# Patient Record
Sex: MALE | Race: WHITE | NOT HISPANIC OR LATINO | Employment: FULL TIME | URBAN - NONMETROPOLITAN AREA
[De-identification: names, ages, dates, MRNs, and addresses within clinical notes are randomized per-mention and may not be internally consistent; named-entity substitution may affect disease eponyms.]

---

## 2024-02-27 ENCOUNTER — APPOINTMENT (OUTPATIENT)
Dept: RADIOLOGY | Facility: HOSPITAL | Age: 49
End: 2024-02-27
Payer: COMMERCIAL

## 2024-02-27 ENCOUNTER — APPOINTMENT (OUTPATIENT)
Dept: CARDIOLOGY | Facility: HOSPITAL | Age: 49
End: 2024-02-27
Payer: COMMERCIAL

## 2024-02-27 ENCOUNTER — HOSPITAL ENCOUNTER (EMERGENCY)
Facility: HOSPITAL | Age: 49
Discharge: HOME | End: 2024-02-27
Attending: EMERGENCY MEDICINE
Payer: COMMERCIAL

## 2024-02-27 VITALS
OXYGEN SATURATION: 95 % | HEIGHT: 69 IN | BODY MASS INDEX: 28.88 KG/M2 | SYSTOLIC BLOOD PRESSURE: 126 MMHG | WEIGHT: 195 LBS | HEART RATE: 70 BPM | RESPIRATION RATE: 20 BRPM | DIASTOLIC BLOOD PRESSURE: 95 MMHG

## 2024-02-27 DIAGNOSIS — R07.9 CHEST PAIN, UNSPECIFIED TYPE: Primary | ICD-10-CM

## 2024-02-27 LAB
ALBUMIN SERPL BCP-MCNC: 5 G/DL (ref 3.4–5)
ALP SERPL-CCNC: 87 U/L (ref 33–120)
ALT SERPL W P-5'-P-CCNC: 110 U/L (ref 10–52)
ANION GAP SERPL CALC-SCNC: 15 MMOL/L (ref 10–20)
AST SERPL W P-5'-P-CCNC: 46 U/L (ref 9–39)
BASOPHILS # BLD AUTO: 0.04 X10*3/UL (ref 0–0.1)
BASOPHILS NFR BLD AUTO: 0.6 %
BILIRUB SERPL-MCNC: 0.5 MG/DL (ref 0–1.2)
BUN SERPL-MCNC: 14 MG/DL (ref 6–23)
CALCIUM SERPL-MCNC: 9.6 MG/DL (ref 8.6–10.3)
CARDIAC TROPONIN I PNL SERPL HS: <3 NG/L (ref 0–20)
CARDIAC TROPONIN I PNL SERPL HS: <3 NG/L (ref 0–20)
CHLORIDE SERPL-SCNC: 102 MMOL/L (ref 98–107)
CO2 SERPL-SCNC: 25 MMOL/L (ref 21–32)
CREAT SERPL-MCNC: 0.95 MG/DL (ref 0.5–1.3)
EGFRCR SERPLBLD CKD-EPI 2021: >90 ML/MIN/1.73M*2
EOSINOPHIL # BLD AUTO: 0.23 X10*3/UL (ref 0–0.7)
EOSINOPHIL NFR BLD AUTO: 3.6 %
ERYTHROCYTE [DISTWIDTH] IN BLOOD BY AUTOMATED COUNT: 12 % (ref 11.5–14.5)
GLUCOSE SERPL-MCNC: 99 MG/DL (ref 74–99)
HCT VFR BLD AUTO: 42.4 % (ref 41–52)
HGB BLD-MCNC: 15.1 G/DL (ref 13.5–17.5)
IMM GRANULOCYTES # BLD AUTO: 0.03 X10*3/UL (ref 0–0.7)
IMM GRANULOCYTES NFR BLD AUTO: 0.5 % (ref 0–0.9)
LYMPHOCYTES # BLD AUTO: 2.01 X10*3/UL (ref 1.2–4.8)
LYMPHOCYTES NFR BLD AUTO: 31.1 %
MAGNESIUM SERPL-MCNC: 1.92 MG/DL (ref 1.6–2.4)
MCH RBC QN AUTO: 32.3 PG (ref 26–34)
MCHC RBC AUTO-ENTMCNC: 35.6 G/DL (ref 32–36)
MCV RBC AUTO: 91 FL (ref 80–100)
MONOCYTES # BLD AUTO: 0.58 X10*3/UL (ref 0.1–1)
MONOCYTES NFR BLD AUTO: 9 %
NEUTROPHILS # BLD AUTO: 3.58 X10*3/UL (ref 1.2–7.7)
NEUTROPHILS NFR BLD AUTO: 55.2 %
NRBC BLD-RTO: 0 /100 WBCS (ref 0–0)
PLATELET # BLD AUTO: 286 X10*3/UL (ref 150–450)
POTASSIUM SERPL-SCNC: 3.5 MMOL/L (ref 3.5–5.3)
PROT SERPL-MCNC: 7.7 G/DL (ref 6.4–8.2)
RBC # BLD AUTO: 4.68 X10*6/UL (ref 4.5–5.9)
SODIUM SERPL-SCNC: 138 MMOL/L (ref 136–145)
WBC # BLD AUTO: 6.5 X10*3/UL (ref 4.4–11.3)

## 2024-02-27 PROCEDURE — 36415 COLL VENOUS BLD VENIPUNCTURE: CPT | Performed by: EMERGENCY MEDICINE

## 2024-02-27 PROCEDURE — 84484 ASSAY OF TROPONIN QUANT: CPT | Performed by: EMERGENCY MEDICINE

## 2024-02-27 PROCEDURE — 80053 COMPREHEN METABOLIC PANEL: CPT | Performed by: EMERGENCY MEDICINE

## 2024-02-27 PROCEDURE — 85025 COMPLETE CBC W/AUTO DIFF WBC: CPT | Performed by: EMERGENCY MEDICINE

## 2024-02-27 PROCEDURE — 83735 ASSAY OF MAGNESIUM: CPT | Performed by: EMERGENCY MEDICINE

## 2024-02-27 PROCEDURE — 93005 ELECTROCARDIOGRAM TRACING: CPT

## 2024-02-27 PROCEDURE — 71275 CT ANGIOGRAPHY CHEST: CPT

## 2024-02-27 PROCEDURE — 71275 CT ANGIOGRAPHY CHEST: CPT | Mod: FOREIGN READ | Performed by: RADIOLOGY

## 2024-02-27 PROCEDURE — 2550000001 HC RX 255 CONTRASTS: Performed by: EMERGENCY MEDICINE

## 2024-02-27 PROCEDURE — 99285 EMERGENCY DEPT VISIT HI MDM: CPT | Mod: 25

## 2024-02-27 RX ORDER — CETIRIZINE HYDROCHLORIDE 5 MG/1
5 TABLET, CHEWABLE ORAL DAILY
COMMUNITY

## 2024-02-27 RX ADMIN — IOHEXOL 75 ML: 350 INJECTION, SOLUTION INTRAVENOUS at 18:05

## 2024-02-27 ASSESSMENT — COLUMBIA-SUICIDE SEVERITY RATING SCALE - C-SSRS
2. HAVE YOU ACTUALLY HAD ANY THOUGHTS OF KILLING YOURSELF?: NO
1. IN THE PAST MONTH, HAVE YOU WISHED YOU WERE DEAD OR WISHED YOU COULD GO TO SLEEP AND NOT WAKE UP?: NO
6. HAVE YOU EVER DONE ANYTHING, STARTED TO DO ANYTHING, OR PREPARED TO DO ANYTHING TO END YOUR LIFE?: NO

## 2024-02-27 ASSESSMENT — PAIN - FUNCTIONAL ASSESSMENT: PAIN_FUNCTIONAL_ASSESSMENT: 0-10

## 2024-02-27 ASSESSMENT — PAIN SCALES - GENERAL: PAINLEVEL_OUTOF10: 3

## 2024-02-28 NOTE — ED PROVIDER NOTES
HPI   Chief Complaint   Patient presents with    Chest Pain     Pressure x 1 week, jaw pain this morning        HPI                    No data recorded                   Patient History   Past Medical History:   Diagnosis Date    Hyperlipidemia, unspecified 09/04/2014    Hyperlipidemia     Past Surgical History:   Procedure Laterality Date    OTHER SURGICAL HISTORY  08/05/2013    Reported Prior Surgical / Procedural History     No family history on file.  Social History     Tobacco Use    Smoking status: Never    Smokeless tobacco: Never   Vaping Use    Vaping Use: Never used   Substance Use Topics    Alcohol use: Yes     Comment: social    Drug use: Never       Physical Exam   ED Triage Vitals [02/27/24 1739]   Temp Heart Rate Respirations BP   -- 94 16 (!) 143/102      Pulse Ox Temp src Heart Rate Source Patient Position   96 % -- -- --      BP Location FiO2 (%)     -- --       Physical Exam  Vitals and nursing note reviewed.   Constitutional:       General: He is not in acute distress.     Appearance: He is well-developed.   HENT:      Head: Normocephalic and atraumatic.   Eyes:      Conjunctiva/sclera: Conjunctivae normal.   Cardiovascular:      Rate and Rhythm: Normal rate and regular rhythm.      Heart sounds: No murmur heard.  Pulmonary:      Effort: Pulmonary effort is normal. No respiratory distress.      Breath sounds: Normal breath sounds.   Abdominal:      Palpations: Abdomen is soft.      Tenderness: There is no abdominal tenderness.   Musculoskeletal:         General: No swelling.      Cervical back: Neck supple.   Skin:     General: Skin is warm and dry.      Capillary Refill: Capillary refill takes less than 2 seconds.   Neurological:      Mental Status: He is alert.   Psychiatric:         Mood and Affect: Mood normal.         ED Course & MDM   ED Course as of 02/27/24 1914 Tue Feb 27, 2024   1831 EKG performed at 1733 showing normal sinus rhythm no ST elevation or depression essentially normal EKG  interpreted by me. [KA]      ED Course User Index  [KA] Graham Galo DO         Diagnoses as of 02/27/24 1914   Chest pain, unspecified type       Medical Decision Making  49-year-old male otherwise healthy no family history no medical problems himself presents to the ER with chest pain for about a week.  Patient got out of town tomorrow so got concerned.  Patient workup initiated here in the ED EKG is unremarkable waiting for blood work and imaging.  Patient care be signed out to the night physician    Repeat EKG performed at 1903 normal sinus rhythm no ST elevation or depression essentially normal EKG interpreted by me.        Procedure  Procedures     Graham Galo DO  02/27/24 1914

## 2024-02-28 NOTE — PROGRESS NOTES
Patient was signed out to me at change of shift by the previous ED team.  Please see previous provider's note for complete history and physical exam.    Briefly, this is a 49-year-old male, presenting to the emergency department for chest pain for the last 7 to 10 days.  Patient states the pain is in the center of his chest, and is worse with coughing, sneezing.  On physical exam, patient is resting comfortably in the bed, no acute distress.  Previous provider did lab work including CBC, CMP, troponin x 2, all of which was negative.  At time of signout, the patient is pending CT PE study.  On reevaluation, the patient is resting comfortably in the bed, in no acute distress.  CT PE study was negative for evidence of pulmonary embolism with an unremarkable CT chest.  I had a long discussion with the patient about needing to follow-up with his primary care provider and cardiologist.  Patient mentions that he is going to [place] for a snowmobiling trip.  I advised him to not ignore his chest pain, and to call 911 if his chest pain gets worse in [place].  I have a low suspicion for cardiac pathology as a cause of the chest pain, however I do feel that he needs close outpatient follow-up which was communicated with the patient.  Patient expressed understanding.  He was ultimately discharged home in stable condition, advised to return to the emergency department for any worsening symptoms.    Impression: Chest pain  Disposition: Discharge home  Condition: Stable    Lianet Mleo,   Emergency Medicine

## 2024-03-03 LAB
ATRIAL RATE: 77 BPM
ATRIAL RATE: 93 BPM
P AXIS: 14 DEGREES
P AXIS: 42 DEGREES
P OFFSET: 200 MS
P OFFSET: 203 MS
P ONSET: 146 MS
P ONSET: 157 MS
PR INTERVAL: 126 MS
PR INTERVAL: 144 MS
Q ONSET: 218 MS
Q ONSET: 220 MS
QRS COUNT: 13 BEATS
QRS COUNT: 15 BEATS
QRS DURATION: 100 MS
QRS DURATION: 96 MS
QT INTERVAL: 344 MS
QT INTERVAL: 394 MS
QTC CALCULATION(BAZETT): 427 MS
QTC CALCULATION(BAZETT): 445 MS
QTC FREDERICIA: 398 MS
QTC FREDERICIA: 428 MS
R AXIS: 24 DEGREES
R AXIS: 52 DEGREES
T AXIS: 44 DEGREES
T AXIS: 54 DEGREES
T OFFSET: 392 MS
T OFFSET: 415 MS
VENTRICULAR RATE: 77 BPM
VENTRICULAR RATE: 93 BPM

## 2024-04-07 PROBLEM — R07.89 OTHER CHEST PAIN: Status: ACTIVE | Noted: 2024-04-07

## 2024-04-11 ENCOUNTER — OFFICE VISIT (OUTPATIENT)
Dept: CARDIOLOGY | Facility: CLINIC | Age: 49
End: 2024-04-11
Payer: COMMERCIAL

## 2024-04-11 VITALS
WEIGHT: 203 LBS | BODY MASS INDEX: 29.98 KG/M2 | SYSTOLIC BLOOD PRESSURE: 132 MMHG | HEART RATE: 70 BPM | DIASTOLIC BLOOD PRESSURE: 84 MMHG

## 2024-04-11 DIAGNOSIS — R07.89 OTHER CHEST PAIN: Primary | ICD-10-CM

## 2024-04-11 DIAGNOSIS — I10 PRIMARY HYPERTENSION: ICD-10-CM

## 2024-04-11 PROCEDURE — 99213 OFFICE O/P EST LOW 20 MIN: CPT | Performed by: INTERNAL MEDICINE

## 2024-04-11 PROCEDURE — 99203 OFFICE O/P NEW LOW 30 MIN: CPT | Performed by: INTERNAL MEDICINE

## 2024-04-11 PROCEDURE — 3079F DIAST BP 80-89 MM HG: CPT | Performed by: INTERNAL MEDICINE

## 2024-04-11 PROCEDURE — 3075F SYST BP GE 130 - 139MM HG: CPT | Performed by: INTERNAL MEDICINE

## 2024-04-11 PROCEDURE — 1036F TOBACCO NON-USER: CPT | Performed by: INTERNAL MEDICINE

## 2024-04-11 ASSESSMENT — ENCOUNTER SYMPTOMS
LOSS OF SENSATION IN FEET: 0
DEPRESSION: 0
OCCASIONAL FEELINGS OF UNSTEADINESS: 0

## 2024-04-11 ASSESSMENT — PAIN SCALES - GENERAL: PAINLEVEL: 0-NO PAIN

## 2024-04-11 NOTE — ASSESSMENT & PLAN NOTE
Chest pain likely musculoskeletal and inflammatory in nature.  He had prolonged chest discomfort with negative EKGs and troponins in the emergency department.  The discomfort resolved on its own and he had a very heavy exertional trip in [place] with no recurrent symptoms whatsoever.  Do not feel that further investigation is needed at this time.  I did ask the patient to return to me if he does have recurrent symptoms.

## 2024-04-11 NOTE — PROGRESS NOTES
Referred by Dr. Banuelos ref. provider found for Establish Care     History Of Present Illness:    Ryan Sheehan is a 49 y.o. male presenting with chest pain.  The patient states that a few weeks ago he developed an episode of chest discomfort.  It was achy pain in his mid chest without radiation or associated signs or symptoms.  The pain would intensify if he sneezed and become more sharp.  But the discomfort was relatively constant and he had scheduled a trip to [place] to [facility] which is a very exertionally heavy.  Still he was a little bit concerned and then 1 morning he developed an aching sensation in his jaws.  With this discomfort as well as the upcoming trip he decided to report to our emergency department where he had a negative investigation.  The discomfort has subsequently resolved totally and he feels well at this time.  But the emergency department did suggest seeing a cardiologist just reports here today.  His twelve-lead EKG was normal and laboratory studies were all unremarkable.    Past Medical History:  He has a past medical history of Hyperlipidemia, unspecified (2014).    Past Surgical History:  None      Social History:  He reports that he has never smoked. He has never used smokeless tobacco. He reports current alcohol use. He reports that he does not use drugs.    Works as a     Family History:  Mother  at age 72 from complications of liver failure.  Father  at age 70 with COPD and heart failure.  At the father has not had any coronary interventions in his history.     Allergies:  Sulfa (sulfonamide antibiotics)    Outpatient Medications:  Current Outpatient Medications   Medication Instructions    cetirizine (ZYRTEC) 5 mg, oral, Daily        Last Recorded Vitals:  Vitals:    24 1022   BP: 132/84   Pulse: 70   Weight: 92.1 kg (203 lb)       Physical Exam:  Constitutional:       Appearance: Not in distress.   Eyes:      Conjunctiva/sclera:  "Conjunctivae normal.   HENT:    Mouth/Throat:      Pharynx: Oropharynx is clear.   Neck:      Vascular: No carotid bruit. JVD normal.   Pulmonary:      Breath sounds: Normal breath sounds. No wheezing. No rales.   Cardiovascular:      Regular rhythm.      Murmurs: There is no murmur.      No gallop.  No click. No rub.   Abdominal:      Palpations: Abdomen is soft.      Tenderness: There is no abdominal tenderness.   Musculoskeletal:         General: No deformity. Neurological:      General: No focal deficit present.             Last Labs:  CBC -  Lab Results   Component Value Date    WBC 6.5 02/27/2024    HGB 15.1 02/27/2024    HCT 42.4 02/27/2024    MCV 91 02/27/2024     02/27/2024       CMP -  Lab Results   Component Value Date    CALCIUM 9.6 02/27/2024    PROT 7.7 02/27/2024    ALBUMIN 5.0 02/27/2024    AST 46 (H) 02/27/2024     (H) 02/27/2024    ALKPHOS 87 02/27/2024    BILITOT 0.5 02/27/2024       LIPID PANEL -   No results found for: \"CHOL\", \"TRIG\", \"HDL\", \"CHHDL\", \"LDLF\", \"VLDL\", \"NHDL\"    RENAL FUNCTION PANEL -   Lab Results   Component Value Date    GLUCOSE 99 02/27/2024     02/27/2024    K 3.5 02/27/2024     02/27/2024    CO2 25 02/27/2024    ANIONGAP 15 02/27/2024    BUN 14 02/27/2024    CREATININE 0.95 02/27/2024    CALCIUM 9.6 02/27/2024    ALBUMIN 5.0 02/27/2024        No results found for: \"BNP\", \"HGBA1C\"    Last Cardiology Tests:  ECG:  ECG 12 lead 02/27/2024    Normal ECG per reportEcho:  No results found for this or any previous visit from the past 1095 days.      Ejection Fractions:  No results found for: \"EF\"    Cath:  No results found for this or any previous visit from the past 1095 days.      Stress Test:  No results found for this or any previous visit from the past 1095 days.      Cardiac Imaging:  No results found for this or any previous visit from the past 1095 days.            Assessment/Plan     Primary hypertension  Blood pressure is borderline on my check.  " Reviewed his home blood pressure log and the vast majority are all very well-controlled with a few mild elevations.  Recommended hygienic measures including regular physical activity and limitations on sodium intake.   recommend the patient check his blood pressure on a regular basis and if he has persistently elevated readings then certainly return for reevaluation.    Other chest pain  Chest pain likely musculoskeletal and inflammatory in nature.  He had prolonged chest discomfort with negative EKGs and troponins in the emergency department.  The discomfort resolved on its own and he had a very heavy exertional trip in [place] with no recurrent symptoms whatsoever.  Do not feel that further investigation is needed at this time.  I did ask the patient to return to me if he does have recurrent symptoms.       Osman Santiago, DO

## 2024-04-11 NOTE — ASSESSMENT & PLAN NOTE
Blood pressure is borderline on my check.  Reviewed his home blood pressure log and the vast majority are all very well-controlled with a few mild elevations.  Recommended hygienic measures including regular physical activity and limitations on sodium intake.   recommend the patient check his blood pressure on a regular basis and if he has persistently elevated readings then certainly return for reevaluation.

## 2024-04-15 NOTE — PROGRESS NOTES
Subjective   Reason for Visit: Ryan Sheehan is an 49 y.o. male here for a CPE     Chief Complaint   Patient presents with    Annual Exam     Patient comes in today for a NPV and CPE. He previously saw Dr. Verde his last CPE was 2016. He states he had gone to the ED for chest pains in Feb. 2024 the ED saw nothing of concern. He then followed up with Cardiologist Dr. Santiago who also saw no concerns but did suggest he establish care with a PCP.       JOSSY Davis is a 50 yo male presenting today as new pt to est care  Previous PCP: Dr Verde     Dx: URTICARIA    Pt has no acute c/o today  Needed to est with PCP     States in Feb 2024 he started having weird red raised marks, ? Hives on random places on his body  Started taking Zyrtec and they have gone away    Pt is followed by cardiology, Dr. Santiago   Pt had gone to ED in Feb for 7-10 days of ongoing CP  Work-up was negative for cardiac     Occupation: Works as a banker at [employer]     Do you take any herbs or supplements that were not prescribed by a doctor? Denies   Colon cancer screening: Due   PSA: N/A due to no family hx   Fasting blood work: Due   HIV/HEP C Screening: Due   Last eye exam: 2016  Last dental Exam: 2023  Exercise: Not regularly     Allergies   Allergen Reactions    Sulfa (Sulfonamide Antibiotics) Myalgia       Admission on 02/27/2024, Discharged on 02/27/2024   Component Date Value Ref Range Status    WBC 02/27/2024 6.5  4.4 - 11.3 x10*3/uL Final    nRBC 02/27/2024 0.0  0.0 - 0.0 /100 WBCs Final    RBC 02/27/2024 4.68  4.50 - 5.90 x10*6/uL Final    Hemoglobin 02/27/2024 15.1  13.5 - 17.5 g/dL Final    Hematocrit 02/27/2024 42.4  41.0 - 52.0 % Final    MCV 02/27/2024 91  80 - 100 fL Final    MCH 02/27/2024 32.3  26.0 - 34.0 pg Final    MCHC 02/27/2024 35.6  32.0 - 36.0 g/dL Final    RDW 02/27/2024 12.0  11.5 - 14.5 % Final    Platelets 02/27/2024 286  150 - 450 x10*3/uL Final    Neutrophils % 02/27/2024 55.2  40.0 - 80.0 % Final     Immature Granulocytes %, Automated 02/27/2024 0.5  0.0 - 0.9 % Final    Immature Granulocyte Count (IG) includes promyelocytes, myelocytes and metamyelocytes but does not include bands. Percent differential counts (%) should be interpreted in the context of the absolute cell counts (cells/UL).    Lymphocytes % 02/27/2024 31.1  13.0 - 44.0 % Final    Monocytes % 02/27/2024 9.0  2.0 - 10.0 % Final    Eosinophils % 02/27/2024 3.6  0.0 - 6.0 % Final    Basophils % 02/27/2024 0.6  0.0 - 2.0 % Final    Neutrophils Absolute 02/27/2024 3.58  1.20 - 7.70 x10*3/uL Final    Percent differential counts (%) should be interpreted in the context of the absolute cell counts (cells/uL).    Immature Granulocytes Absolute, Au* 02/27/2024 0.03  0.00 - 0.70 x10*3/uL Final    Lymphocytes Absolute 02/27/2024 2.01  1.20 - 4.80 x10*3/uL Final    Monocytes Absolute 02/27/2024 0.58  0.10 - 1.00 x10*3/uL Final    Eosinophils Absolute 02/27/2024 0.23  0.00 - 0.70 x10*3/uL Final    Basophils Absolute 02/27/2024 0.04  0.00 - 0.10 x10*3/uL Final    Glucose 02/27/2024 99  74 - 99 mg/dL Final    Sodium 02/27/2024 138  136 - 145 mmol/L Final    Potassium 02/27/2024 3.5  3.5 - 5.3 mmol/L Final    Chloride 02/27/2024 102  98 - 107 mmol/L Final    Bicarbonate 02/27/2024 25  21 - 32 mmol/L Final    Anion Gap 02/27/2024 15  10 - 20 mmol/L Final    Urea Nitrogen 02/27/2024 14  6 - 23 mg/dL Final    Creatinine 02/27/2024 0.95  0.50 - 1.30 mg/dL Final    eGFR 02/27/2024 >90  >60 mL/min/1.73m*2 Final    Calculations of estimated GFR are performed using the 2021 CKD-EPI Study Refit equation without the race variable for the IDMS-Traceable creatinine methods.  https://jasn.asnjournals.org/content/early/2021/09/22/ASN.2695891130    Calcium 02/27/2024 9.6  8.6 - 10.3 mg/dL Final    Albumin 02/27/2024 5.0  3.4 - 5.0 g/dL Final    Alkaline Phosphatase 02/27/2024 87  33 - 120 U/L Final    Total Protein 02/27/2024 7.7  6.4 - 8.2 g/dL Final    AST 02/27/2024 46 (H)  9  "- 39 U/L Final    Bilirubin, Total 02/27/2024 0.5  0.0 - 1.2 mg/dL Final    ALT 02/27/2024 110 (H)  10 - 52 U/L Final    Patients treated with Sulfasalazine may generate falsely decreased results for ALT.    Magnesium 02/27/2024 1.92  1.60 - 2.40 mg/dL Final    Ventricular Rate 02/27/2024 93  BPM Final    Atrial Rate 02/27/2024 93  BPM Final    IA Interval 02/27/2024 126  ms Final    QRS Duration 02/27/2024 100  ms Final    QT Interval 02/27/2024 344  ms Final    QTC Calculation(Bazett) 02/27/2024 427  ms Final    P Axis 02/27/2024 14  degrees Final    R Axis 02/27/2024 52  degrees Final    T Axis 02/27/2024 54  degrees Final    QRS Count 02/27/2024 15  beats Final    Q Onset 02/27/2024 220  ms Final    P Onset 02/27/2024 157  ms Final    P Offset 02/27/2024 203  ms Final    T Offset 02/27/2024 392  ms Final    QTC Fredericia 02/27/2024 398  ms Final    Ventricular Rate 02/27/2024 77  BPM Final    Atrial Rate 02/27/2024 77  BPM Final    IA Interval 02/27/2024 144  ms Final    QRS Duration 02/27/2024 96  ms Final    QT Interval 02/27/2024 394  ms Final    QTC Calculation(Bazett) 02/27/2024 445  ms Final    P Axis 02/27/2024 42  degrees Final    R Axis 02/27/2024 24  degrees Final    T Axis 02/27/2024 44  degrees Final    QRS Count 02/27/2024 13  beats Final    Q Onset 02/27/2024 218  ms Final    P Onset 02/27/2024 146  ms Final    P Offset 02/27/2024 200  ms Final    T Offset 02/27/2024 415  ms Final    QTC Fredericia 02/27/2024 428  ms Final    Troponin I, High Sensitivity 02/27/2024 <3  0 - 20 ng/L Final    Troponin I, High Sensitivity 02/27/2024 <3  0 - 20 ng/L Final     Patient Care Team:  TOÑA Jenkins as PCP - General (Family Medicine)  Nathan S Franco, APRN-CNP as PCP - [employer] [facility] PCP     Review of Systems  ROS was completed and all systems are negative with the exception of what was noted in the the HPI.       Objective   Vitals:  /84   Pulse 77   Ht 1.753 m (5' 9\")   Wt 93.8 kg (206 lb " 12.8 oz)   SpO2 94%   BMI 30.54 kg/m²       Current Outpatient Medications   Medication Instructions    cetirizine (ZYRTEC) 5 mg, oral, Daily       Physical Exam  Vitals reviewed.   HENT:      Left Ear: Tympanic membrane normal.      Mouth/Throat:      Mouth: Mucous membranes are moist.   Eyes:      Conjunctiva/sclera: Conjunctivae normal.   Cardiovascular:      Pulses: Normal pulses.      Heart sounds: Normal heart sounds.   Pulmonary:      Effort: Pulmonary effort is normal.      Breath sounds: Normal breath sounds.   Abdominal:      General: Bowel sounds are normal.   Skin:     General: Skin is warm and dry.      Capillary Refill: Capillary refill takes less than 2 seconds.   Neurological:      Mental Status: He is alert and oriented to person, place, and time.   Psychiatric:         Mood and Affect: Mood normal.         Thought Content: Thought content normal.         Judgment: Judgment normal.         Assessment/Plan   Problem List Items Addressed This Visit             ICD-10-CM    Annual physical exam - Primary Z00.00     - Counseled on healthy diet and regular exercise  - Fall avoidance information provided  - Personalized prevention plan provided   - Colon   - Vaccines UTD                    Relevant Orders    Comprehensive Metabolic Panel    Lipid Panel     Other Visit Diagnoses         Codes    Screening for HIV without presence of risk factors     Z11.4    Relevant Orders    HIV 1/2 Antigen/Antibody Screen with Reflex to Confirmation    Encounter for hepatitis C screening test for low risk patient     Z11.59    Relevant Orders    Hepatitis C Antibody    Screening for malignant neoplasm of colon     Z12.11    Relevant Orders    Cologuard® colon cancer screening    Need for vaccination     Z23    Relevant Orders    Tdap vaccine, age 7 years and older  (BOOSTRIX) (Completed)

## 2024-04-16 ENCOUNTER — OFFICE VISIT (OUTPATIENT)
Dept: PRIMARY CARE | Facility: CLINIC | Age: 49
End: 2024-04-16
Payer: COMMERCIAL

## 2024-04-16 VITALS
OXYGEN SATURATION: 94 % | SYSTOLIC BLOOD PRESSURE: 120 MMHG | DIASTOLIC BLOOD PRESSURE: 84 MMHG | HEIGHT: 69 IN | HEART RATE: 77 BPM | WEIGHT: 206.8 LBS | BODY MASS INDEX: 30.63 KG/M2

## 2024-04-16 DIAGNOSIS — Z11.59 ENCOUNTER FOR HEPATITIS C SCREENING TEST FOR LOW RISK PATIENT: ICD-10-CM

## 2024-04-16 DIAGNOSIS — Z23 NEED FOR VACCINATION: ICD-10-CM

## 2024-04-16 DIAGNOSIS — Z11.4 SCREENING FOR HIV WITHOUT PRESENCE OF RISK FACTORS: ICD-10-CM

## 2024-04-16 DIAGNOSIS — Z00.00 ANNUAL PHYSICAL EXAM: Primary | ICD-10-CM

## 2024-04-16 DIAGNOSIS — Z12.11 SCREENING FOR MALIGNANT NEOPLASM OF COLON: ICD-10-CM

## 2024-04-16 PROBLEM — R07.89 OTHER CHEST PAIN: Status: RESOLVED | Noted: 2024-04-07 | Resolved: 2024-04-16

## 2024-04-16 PROBLEM — D22.5 MELANOCYTIC NEVI OF TRUNK: Status: ACTIVE | Noted: 2022-09-20

## 2024-04-16 PROBLEM — E78.5 HYPERLIPIDEMIA: Status: ACTIVE | Noted: 2024-04-16

## 2024-04-16 PROCEDURE — 90471 IMMUNIZATION ADMIN: CPT | Performed by: NURSE PRACTITIONER

## 2024-04-16 PROCEDURE — 3079F DIAST BP 80-89 MM HG: CPT | Performed by: NURSE PRACTITIONER

## 2024-04-16 PROCEDURE — 99386 PREV VISIT NEW AGE 40-64: CPT | Performed by: NURSE PRACTITIONER

## 2024-04-16 PROCEDURE — 1036F TOBACCO NON-USER: CPT | Performed by: NURSE PRACTITIONER

## 2024-04-16 PROCEDURE — 90715 TDAP VACCINE 7 YRS/> IM: CPT | Performed by: NURSE PRACTITIONER

## 2024-04-16 PROCEDURE — 3074F SYST BP LT 130 MM HG: CPT | Performed by: NURSE PRACTITIONER

## 2024-04-16 NOTE — PATIENT INSTRUCTIONS
Thank you for seeing me today.  It was a pleasure to see you again!    Today we did your Annual Physical Exam and discussed the following:     Tdap today     Do Cologuard    Lab work ordered today.  Please have your blood drawn in the next 1-2 weeks.  You need to be FASTING for 12 hours prior to blood draw.  You may only have water.  Please contact your insurance company to ask about the best location to get blood drawn.  We will contact you with the results of your blood work and any necessary adjustments  to your plan of care, if you do not hear from us within 3-5 days of having your blood drawn, please call the office at [phone].    RTC ANNUALLY AND AS NEEDED

## 2024-04-16 NOTE — ASSESSMENT & PLAN NOTE
- Counseled on healthy diet and regular exercise  - Fall avoidance information provided  - Personalized prevention plan provided   - Colon   - Vaccines UTD

## 2024-04-23 ENCOUNTER — LAB (OUTPATIENT)
Dept: LAB | Facility: LAB | Age: 49
End: 2024-04-23
Payer: COMMERCIAL

## 2024-04-23 DIAGNOSIS — Z00.00 ANNUAL PHYSICAL EXAM: ICD-10-CM

## 2024-04-23 DIAGNOSIS — Z11.59 ENCOUNTER FOR HEPATITIS C SCREENING TEST FOR LOW RISK PATIENT: ICD-10-CM

## 2024-04-23 DIAGNOSIS — Z11.4 SCREENING FOR HIV WITHOUT PRESENCE OF RISK FACTORS: ICD-10-CM

## 2024-04-23 LAB
ALBUMIN SERPL BCP-MCNC: 4.6 G/DL (ref 3.4–5)
ALP SERPL-CCNC: 68 U/L (ref 33–120)
ALT SERPL W P-5'-P-CCNC: 77 U/L (ref 10–52)
ANION GAP SERPL CALC-SCNC: 13 MMOL/L (ref 10–20)
AST SERPL W P-5'-P-CCNC: 35 U/L (ref 9–39)
BILIRUB SERPL-MCNC: 0.7 MG/DL (ref 0–1.2)
BUN SERPL-MCNC: 15 MG/DL (ref 6–23)
CALCIUM SERPL-MCNC: 9.2 MG/DL (ref 8.6–10.3)
CHLORIDE SERPL-SCNC: 104 MMOL/L (ref 98–107)
CHOLEST SERPL-MCNC: 223 MG/DL (ref 0–199)
CHOLESTEROL/HDL RATIO: 4.2
CO2 SERPL-SCNC: 26 MMOL/L (ref 21–32)
CREAT SERPL-MCNC: 0.96 MG/DL (ref 0.5–1.3)
EGFRCR SERPLBLD CKD-EPI 2021: >90 ML/MIN/1.73M*2
GLUCOSE SERPL-MCNC: 99 MG/DL (ref 74–99)
HCV AB SER QL: NONREACTIVE
HDLC SERPL-MCNC: 52.7 MG/DL
HIV 1+2 AB+HIV1 P24 AG SERPL QL IA: NONREACTIVE
LDLC SERPL CALC-MCNC: 154 MG/DL
NON HDL CHOLESTEROL: 170 MG/DL (ref 0–149)
POTASSIUM SERPL-SCNC: 4 MMOL/L (ref 3.5–5.3)
PROT SERPL-MCNC: 7 G/DL (ref 6.4–8.2)
SODIUM SERPL-SCNC: 139 MMOL/L (ref 136–145)
TRIGL SERPL-MCNC: 81 MG/DL (ref 0–149)
VLDL: 16 MG/DL (ref 0–40)

## 2024-04-23 PROCEDURE — 80061 LIPID PANEL: CPT

## 2024-04-23 PROCEDURE — 86803 HEPATITIS C AB TEST: CPT

## 2024-04-23 PROCEDURE — 36415 COLL VENOUS BLD VENIPUNCTURE: CPT

## 2024-04-23 PROCEDURE — 80053 COMPREHEN METABOLIC PANEL: CPT

## 2024-04-23 PROCEDURE — 87389 HIV-1 AG W/HIV-1&-2 AB AG IA: CPT

## 2024-04-24 NOTE — RESULT ENCOUNTER NOTE
Ryan Sheehan    I have reviewed all of your blood work and it looks okay:   Your LDLs (bad cholesterol) were higher than normal.    Please try to exercise regularly: at least 150 minutes/week  Cut back on foods high in trans fat and saturated fats, like red meats, creams, cheese, and butter  Limit sweets and salt   Our goal is to have your LDL's < 70    Your ALT was slightly elevated, it has come down from a month ago.  So just watch tylenol and alcohol intake.     No changes to your plan of care as we discussed in the office.     If you have any questions, please feel free to call my office.    Take Care,   Lilian

## 2024-05-15 LAB — NONINV COLON CA DNA+OCC BLD SCRN STL QL: NEGATIVE

## 2024-05-16 NOTE — RESULT ENCOUNTER NOTE
Ryan Sheehan    Your colon cancer screening test, Cologcami was negative. We will repeat this screening in 3 years.    Take care  Lilian

## 2024-09-24 ENCOUNTER — APPOINTMENT (OUTPATIENT)
Dept: DERMATOLOGY | Facility: CLINIC | Age: 49
End: 2024-09-24
Payer: COMMERCIAL

## 2024-09-24 DIAGNOSIS — D22.9 BENIGN NEVUS: Primary | ICD-10-CM

## 2024-09-24 DIAGNOSIS — L57.9 SKIN CHANGES DUE TO CHRONIC EXPOSURE TO NONIONIZING RADIATION: ICD-10-CM

## 2024-09-24 DIAGNOSIS — L81.4 LENTIGO: ICD-10-CM

## 2024-09-24 DIAGNOSIS — D18.01 ANGIOMA OF SKIN: ICD-10-CM

## 2024-09-24 DIAGNOSIS — L82.1 SEBORRHEIC KERATOSIS: ICD-10-CM

## 2024-09-24 PROCEDURE — 99213 OFFICE O/P EST LOW 20 MIN: CPT | Performed by: NURSE PRACTITIONER

## 2024-09-24 PROCEDURE — 1036F TOBACCO NON-USER: CPT | Performed by: NURSE PRACTITIONER

## 2024-09-24 NOTE — PROGRESS NOTES
Subjective     Ryan Sheehan is a 49 y.o. male who presents for the following: Skin Check.     Review of Systems:  No other skin or systemic complaints other than what is documented elsewhere in the note.    The following portions of the chart were reviewed this encounter and updated as appropriate:          Skin Cancer History  No skin cancer on file.      Specialty Problems          Dermatology Problems    Melanocytic nevi of trunk        Objective   Well appearing patient in no apparent distress; mood and affect are within normal limits.    A full examination was performed including scalp, head, eyes, ears, nose, lips, neck, chest, axillae, abdomen, back, buttocks, bilateral upper extremities, bilateral lower extremities, hands, feet, fingers, toes, fingernails, and toenails. All findings within normal limits unless otherwise noted below.      Assessment/Plan   1. Benign nevus  Scattered, uniform and benign-appearing, regular brown melanocytic papules and macules.    1. Right deltoid has a 3.5 x 3.5 mm tan brown  macule with multicomponent reticular homogenous pattern with asymmetrical brown dots.  2. Mid abdomen has a 9 x 10 mm varigateddark brown macule with broken reticular pattern (several similar nevi noted of this pattern)   3. Right parietal scalp has a 6 x 4 mm slightly irregular shape varigated dark brown macule with reticular pattern throughout    The present appearance of the lesions are not worrisome but it should continue to be observed and testing/treatment may be warranted if change occurs.    Lesions being monitored are stable. Compared to baseline photos available, no new or changing lesions noted.     Related Procedures  Follow Up In Dermatology - Established Patient    2. Angioma of skin  Scattered cherry-red papule(s).    A cherry hemangioma is a small macule (small, flat, smooth area) or papule (small, solid bump) formed from an overgrowth of tiny blood vessels in the skin. Cherry  hemangiomas are characteristically red or purplish in color. They often first appear in middle adulthood and usually increase in number with age. Cherry hemangiomas are noncancerous (benign) and are common in adults.    The present appearance of the lesion is not worrisome but it should continue to be observed and testing/treatment may be warranted if change occurs.    Related Procedures  Follow Up In Dermatology - Established Patient    3. Seborrheic keratosis  Stuck on verrucous, tan-brown papules and plaques.      Seborrheic keratoses are common noncancerous (benign) growths of unknown cause seen in adults due to a thickening of an area of the top skin layer. Seborrheic keratoses may appear as if they are stuck on to the skin. They have distinct borders, and they may appear as papules (small, solid bumps) or plaques (solid, raised patches that are bigger than a thumbnail). They may be the same color as your skin, or they may be pink, light brown, darker brown, or very dark brown, or sometimes may appear black.    There is no way to prevent new seborrheic keratoses from forming. Seborrheic keratoses can be removed, but removal is considered a cosmetic issue and is usually not covered by insurance.    PLAN  No treatment is needed unless there is irritation from clothing, such as itching or bleeding.  2.   Some lotions containing alpha hydroxy acids, salicylic acid, or urea may make the areas feel smoother with regular use but will not eliminate them.    Related Procedures  Follow Up In Dermatology - Established Patient    4. Lentigo  Scattered tan macules in sun-exposed areas.    A solar lentigo (plural, solar lentigines), also known as a sun-induced freckle or senile lentigo, is a dark (hyperpigmented) lesion caused by natural or artificial ultraviolet (UV) light. Solar lentigines may be single or multiple. This type of lentigo is different from a simple lentigo (lentigo simplex) because it is caused by exposure  to UV light. Solar lentigines are benign, but they do indicate excessive sun exposure, a risk factor for the development of skin cancer.    To prevent solar lentigines, avoid exposure to sunlight in midday (10 AM to 3 PM), wear sun-protective clothing (tightly woven clothes and hats), and apply sunscreen (SPF 30 UVA and UVB block).    The present appearance of the lesion is not worrisome but it should continue to be observed and testing/treatment may be warranted if change occurs.    Related Procedures  Follow Up In Dermatology - Established Patient    5. Skin changes due to chronic exposure to nonionizing radiation  Actinic changes in the form of freckles, lentigines and hyper/hypopigmentation     ABCDEs of melanoma and atypical moles were discussed with the patient.    Patient was instructed to perform monthly self skin examination.  We recommended that the patient have regular full skin exams given an increased risk of subsequent skin cancers.    The patient was instructed to use sun protective behaviors including use of broad spectrum sunscreens and sun protective clothing to reduce risk of skin cancers.    Warning signs of non-melanoma skin cancer discussed.    Related Procedures  Follow Up In Dermatology - Established Patient        Return to clinic in 1 year for skin check/follow up or sooner if needed

## 2024-09-24 NOTE — PATIENT INSTRUCTIONS

## 2025-09-30 ENCOUNTER — APPOINTMENT (OUTPATIENT)
Dept: DERMATOLOGY | Facility: CLINIC | Age: 50
End: 2025-09-30
Payer: COMMERCIAL